# Patient Record
Sex: MALE | Race: WHITE
[De-identification: names, ages, dates, MRNs, and addresses within clinical notes are randomized per-mention and may not be internally consistent; named-entity substitution may affect disease eponyms.]

---

## 2017-05-04 ENCOUNTER — HOSPITAL ENCOUNTER (EMERGENCY)
Dept: HOSPITAL 44 - ED | Age: 72
Discharge: HOME | End: 2017-05-04
Payer: MEDICARE

## 2017-05-04 VITALS — DIASTOLIC BLOOD PRESSURE: 79 MMHG | SYSTOLIC BLOOD PRESSURE: 140 MMHG

## 2017-05-04 DIAGNOSIS — J20.9: Primary | ICD-10-CM

## 2017-05-04 LAB
BASOPHILS NFR BLD: 0.4 % (ref 0–1.5)
EGFR (AFRICAN): 55
EGFR (NON-AFRICAN): 45
EOSINOPHIL NFR BLD: 1.1 % (ref 0–6.8)
MCH RBC QN AUTO: 29.6 PG (ref 28–34)
MCV RBC AUTO: 87.8 FL (ref 80–100)
MONOCYTES %: 5.4 % (ref 0–11)
NEUTROPHILS #: 11.5 # K/UL (ref 1.4–7.7)

## 2017-05-04 PROCEDURE — 85025 COMPLETE CBC W/AUTO DIFF WBC: CPT

## 2017-05-04 PROCEDURE — 99283 EMERGENCY DEPT VISIT LOW MDM: CPT

## 2017-05-04 PROCEDURE — 71020: CPT

## 2017-05-04 PROCEDURE — 80053 COMPREHEN METABOLIC PANEL: CPT

## 2017-05-04 PROCEDURE — S1016 NON-PVC INTRAVENOUS ADMINIST: HCPCS

## 2017-05-04 NOTE — DIAGNOSTIC IMAGING REPORT
RAFA PERDOMO 

[facility]

[address] [facility] [address] [place] [address]. [id number]

 

 

 

 

Report Submission Date: May 4, 2017 8:59:42 AM CDT

Patient       Study

Name:   AARON ELIAS       Date:   May 4, 2017 8:16:40 AM CDT

MRN:   [id number]       Modality Type:   CR

Gender:   M       Description:   CHEST

:   [birth year]       Institution:   [facility]

Physician:   RAFA PERDOMO

         

 

 

Pa and lateral chest 





Clinical history :short of breath cough 



Comparison: 2016 





Technique pa and lateral upright 





Findings: The lung fields are clear. I see no hilar or mediastinal mass. There 
is no pleural effusion. The aorta is tortuous. There is borderline 
cardiomegaly. Thoracic spondylosis is present. 





Impression: No acute pulmonary disease 



No significant change from the previous chest radiograph

 

Electronically signed on May 4, 2017 8:59:42 AM CDT by:

Paulo VILLEDA

## 2017-05-04 NOTE — ED PHYSICIAN DOCUMENTATION
General Adult





- HISTORIAN


Historian: patient, spouse





- HPI


Stated Complaint: short of breath, cough


Chief Complaint: General Adult


Additional Information: 





Three days non-productive cough, SOB. Saw Nell Christensen yesterday and started 

Levaquin for bronchitis. Took one pill yesterday and half a pill this morning. 

Notes his balance and mobility are worse since cough began, Has Parkinson's. 





- ROS


CONST: fever ("low grade").  denies: sweating





- PAST HX


Past History: hypertension, other (Parkinson's)


Allergies/Adverse Reactions: 


 Allergies











Allergy/AdvReac Type Severity Reaction Status Date / Time


 


Penicillins Allergy Intermediate Rash Verified 10/05/16 03:52














Home Medications: 


 Ambulatory Orders











 Medication  Instructions  Recorded


 


Carbidopa/Levodopa [Carbidopa-Levo 75 mg PO D 02/28/16





 mg Odt]  


 


Levothyroxine Sodium [Unithroid] 100 mg PO D 02/28/16


 


amLODIPine BESYLATE [Norvasc] 10 mg PO D 02/28/16


 


Albuterol Sulfate [Ventolin] 2 mg IH BID 05/04/17


 


Atorvastatin Calcium [Atorvastatin 80 mg PO HS 05/04/17





Calcium]  


 


Azelastine HCl [Azelastine HCl] 50 mg IH BID 05/04/17


 


Beclomethasone Dipropionate [Qvar] 8.7 mg IH BID 05/04/17


 


Levofloxacin [Levaquin] 500 mg PO DAILY 05/04/17


 


Rasagiline Mesylate [Azilect] 0.5 mg PO DAILY 05/04/17














- SOCIAL HX


Smoking History: non-smoker





- FAMILY HX


Family History: Yes (wife had URI several days ago)





- VITAL SIGNS


Vital Signs: 





 Vital Signs











Temp Pulse Resp BP Pulse Ox


 


          149/88    


 


          10/05/16 04:53   














- REVIEWED ASSESSMENTS


Nursing Assessment  Reviewed: Yes


Vitals Reviewed: Yes





Progress





- Progress


Progress: 


 








Pa and lateral chest 








Clinical history :short of breath cough 





Comparison: February 22, 2016 








Technique pa and lateral upright 








Findings: The lung fields are clear. I see no hilar or mediastinal mass. There 

is no pleural effusion. The aorta is tortuous. There is borderline 

cardiomegaly. Thoracic spondylosis is present. 








Impression: No acute pulmonary disease 





No significant change from the previous chest radiograph





 








Electronically signed on May 4, 2017 8:59:42 AM CDT by:





Paulo Terrazas








[id number], Has had two Duonebs and obe albuterol neb. Have spoken with Dr. Cano, 

[facility], [facility], about patient three times. Pt ambukated 100-150 feet with pulse ox 

89-90% on ambient air. Drops to 87% when he returns to room, then, within 3 

minutes, back to 90%. No asthma or COPD history. Will send home with [id number]

, continue levaquin. Return to ER with increased difficulty breathing. 





ED Results Lab/Radiology





- Orders


Orders: 





 ED Orders











 Category Date Time Status


 


 Place Saline Lock/IV Now Care  05/04/17 07:40 Active


 


 CHEST 2 VIEW [CHEST P.A.&LAT 2 VIEWS] [RAD] Stat Exams  05/04/17 Ordered


 


 CBC/PLATELET/DIFF Routine Lab  05/04/17 Ordered


 


 CMP Routine Lab  05/04/17 Ordered


 


 URINALYSIS Routine Lab  05/04/17 Ordered


 


 Ipratropium/Albuterol Sulfate [Duoneb] Med  05/04/17 07:51 Once





 3 ml NEB NOW ONE   














General Adult Physical Exam





- PHYSICAL EXAM


GENERAL APPEARANCE: mild distress


EENT: eye inspection normal, ENT inspection normal


NECK: normal inspection, supple


RESPIRATORY: breath sounds normal, wheezes (fine, throughout)


CVS: reg rate & rhythm, heart sounds normal


ABDOMEN: soft, normal bowel sounds, other (diastasis )


RECTAL: deferred


BACK: normal inspection


SKIN: warm/dry, normal color


EXTREMITIES: no evidence of injury


NEURO: CN's nml as tested, motor nml, sensation nml, cognition normal





Discharge


Clincal Impression: 


 Bronchitis





Additional Instructions: 


Return to ER with increased difficulty breathing. 


Home Medications: 


Ambulatory Orders





Carbidopa/Levodopa [Carbidopa-Levo  mg Odt] 75 mg PO D 02/28/16 


Levothyroxine Sodium [Unithroid] 100 mg PO D 02/28/16 


amLODIPine BESYLATE [Norvasc] 10 mg PO D 02/28/16 


Albuterol Sulfate [Ventolin] 2 mg IH BID 05/04/17 


Atorvastatin Calcium [Atorvastatin Calcium] 80 mg PO HS 05/04/17 


Azelastine HCl [Azelastine HCl] 50 mg IH BID 05/04/17 


Beclomethasone Dipropionate [Qvar] 8.7 mg IH BID 05/04/17 


Levofloxacin [Levaquin] 500 mg PO DAILY 05/04/17 


Rasagiline Mesylate [Azilect] 0.5 mg PO DAILY 05/04/17 








Condition: Fair


Disposition: 01 HOME, SELF-CARE


Decision to Admit: NO


Decision Time: 11:46

## 2018-03-13 ENCOUNTER — HOSPITAL ENCOUNTER (EMERGENCY)
Dept: HOSPITAL 44 - ED | Age: 73
Discharge: HOME | End: 2018-03-13
Payer: MEDICARE

## 2018-03-13 VITALS — SYSTOLIC BLOOD PRESSURE: 138 MMHG | DIASTOLIC BLOOD PRESSURE: 75 MMHG

## 2018-03-13 DIAGNOSIS — J45.901: Primary | ICD-10-CM

## 2018-03-13 LAB
BASOPHILS NFR BLD: 0.6 % (ref 0–1.5)
EGFR (AFRICAN): > 60
EGFR (NON-AFRICAN): 53
EOSINOPHIL NFR BLD: 4.9 % (ref 0–6.8)
MCH RBC QN AUTO: 29.9 PG (ref 28–34)
MCV RBC AUTO: 89 FL (ref 80–100)
MONOCYTES %: 6 % (ref 0–11)
NEUTROPHILS #: 7 # K/UL (ref 1.4–7.7)

## 2018-03-13 PROCEDURE — 99282 EMERGENCY DEPT VISIT SF MDM: CPT

## 2018-03-13 PROCEDURE — 71046 X-RAY EXAM CHEST 2 VIEWS: CPT

## 2018-03-13 PROCEDURE — 80053 COMPREHEN METABOLIC PANEL: CPT

## 2018-03-13 PROCEDURE — 85025 COMPLETE CBC W/AUTO DIFF WBC: CPT

## 2018-03-13 RX ADMIN — GUAIFENESIN AND CODEINE PHOSPHATE ONE ML: 10; 100 LIQUID ORAL at 00:52

## 2018-03-13 RX ADMIN — IPRATROPIUM BROMIDE AND ALBUTEROL SULFATE ONE: .5; 3 SOLUTION RESPIRATORY (INHALATION) at 01:01

## 2018-03-13 RX ADMIN — IPRATROPIUM BROMIDE AND ALBUTEROL SULFATE STA ML: .5; 3 SOLUTION RESPIRATORY (INHALATION) at 00:52

## 2018-03-13 NOTE — DIAGNOSTIC IMAGING REPORT
GINA FREED (NP) - ER 

[facility]

[address] [facility] [address]

[place], [place]. [address]

 

 

 

 

Report Submission Date: Mar 13, 2018 12:55:13 AM CDT

Patient       Study

Name:   AARON ELIAS       Date:   Mar 13, 2018 12:35:21 AM CDT

MRN:   [id number]       Modality Type:   DX

Gender:   M       Description:   CHEST

:   [birth year]       Institution:   [facility]

Physician:   GINA FREED (NP) - ER

     Accession:    [id number]

 

 

Pa and lateral chest 





Clinical history :short of breath coughing 



Comparison: None on the  



Technique pa and lateral upright 





Findings: The lung fields are mildly hyperinflated.. I see no hilar or 
mediastinal mass. Aortic arch calcification is present. There is no pleural 
effusion. There is multilevel thoracic spondylosis. 





Impression: No acute pulmonary disease

 

Electronically signed on Mar 13, 2018 12:55:13 AM CDT by:

Paulo VILLEDA